# Patient Record
Sex: MALE | Race: WHITE | Employment: OTHER | URBAN - METROPOLITAN AREA
[De-identification: names, ages, dates, MRNs, and addresses within clinical notes are randomized per-mention and may not be internally consistent; named-entity substitution may affect disease eponyms.]

---

## 2023-03-03 NOTE — H&P (VIEW-ONLY)
Tim Jackson.   1940     ASSESSMENT:  1. Kidney Stones              s/p L URS/LL10/28/20   s/p URS (?)(`13)              CT A/P 11/28/22: 8 mm RRP stone; 2 mm non-obstructing LMP stone   Last Ucx 01/12/23: no growth                 2. BPH with Urinary Urgency / Urinary Frequency               Failed Flomax(SE: polyuria)              Symptom(s): Rec UTI, Intermittent FOS     3. Prostate Cancer Screening              Last PSA 2020- 1.5              HOLLI 02/01/22: 40 grams, benign      5. Gross Hematuria 2/2 #1              CTU 11/29/22- 8 x 4 mm stone in the dependent right renal pelvis. 2 mm nonobstructing left renal stone. Irregular urothelial thickening along the left ureter extending to the renal pelvis (series  604 images 61-66). No suspicious filling defects   Ucytology 09/20/22: [address]     6. ED, on Viagra 100 mg PRN  7. AFIB/Hx DVT, on Eliquis 5 mg   No longer Plavix 75 mg    Stone in renal pelvis, high chance of UPJ engagemnt. Not visible on KUB. On A/C therefore recommend URS. Cleared by cards. Urine culture sent. Stop Elliquis 2 days before. Regarding LUTS, didn't tolerate flomax in past due to urgency/frequency. Start finasteride for long term management. PLAN:  Urine sent for culture today, for pre-op planning purposes  Proceed to Cysto R URS/LL/JJ stent placement [facility] 03/21/23; D/c Eliquis 2 days prior per CARDS  BMP/CBC drawn today  RTO post op     DISCUSSION:      Chief Complaint   Patient presents with    Nephrolithiasis       HISTORY OF PRESENT ILLNESS: Tim Ontiveros is a 80 y.o. male who presents today for follow up for  H&P for upcoming Cysto R URS/LL/JJ stent placement [facility] 03/21/23 for a 8x4 mm RRP stone; also 2 mm non-obstructing stone noted on CT A/P 11/28/22. Patient with established history of kidney stones s/p left URS with laser lithotripsy and stent exchange on 10/28/2020 with Dr. Lexie Servin.  Also with BPH with LUTS, d/c Flomax 0.4 mg d/t increased urinary release tablet Take 10 mg by mouth daily    predniSONE (DELTASONE) 10 MG tablet prednisone 10 mg tablet   take 7 tablets by mouth on day 1 DECREASE BY 1 TABLET EACH DAY UNTIL FINISHED    pregabalin (LYRICA) 75 MG capsule pregabalin 75 mg capsule   take 1 capsule by mouth twice a day    rosuvastatin (CRESTOR) 20 MG tablet Take 10 mg by mouth    sildenafil (VIAGRA) 100 MG tablet Take 100 mg by mouth as needed    tamsulosin (FLOMAX) 0.4 MG capsule Take 0.4 mg by mouth    tiZANidine (ZANAFLEX) 2 MG tablet tizanidine 2 mg tablet     No current facility-administered medications for this visit. History reviewed. No pertinent family history. Social History     Socioeconomic History    Marital status:      Spouse name: None    Number of children: None    Years of education: None    Highest education level: None   Tobacco Use    Smoking status: Never    Smokeless tobacco: Never   Substance and Sexual Activity    Alcohol use: No    Drug use: No         PHYSICAL EXAMINATION:   Ht 6' 2\" (1.88 m)   Wt 250 lb (113.4 kg)   BMI 32.10 kg/m²   Constitutional: No acute distress. CV:  Radial pulse palpable. Respiratory: No respiratory distress. Abdomen:  Non-distended. Neuro/Psych:  Grossly intact. Appropriate affect. Lymphatic:   No gross lymphadenopathy. LABS TODAY:  Recent Results (from the past 12 hour(s))   AMB POC URINALYSIS DIP STICK AUTO W/O MICRO    Collection Time: 03/08/23  9:15 AM   Result Value Ref Range    Color, Urine, POC Yellow     Clarity, Urine, POC Clear     Glucose, Urine, POC Negative Negative    Bilirubin, Urine, POC Negative Negative    Ketones, Urine, POC Negative Negative    Specific Gravity, Urine, POC 1.025 1.001 - 1.035    Blood, Urine, POC 3+ Negative    pH, Urine, POC 7.0 4.6 - 8.0    Protein, Urine, POC 2+ Negative    Urobilinogen, POC Normal     Nitrite, Urine, POC Negative Negative    Leukocyte Esterase, Urine, POC Negative Negative         Thora Render.  Manuelito Egan MD  [facility]

## 2023-03-15 RX ORDER — CARVEDILOL 25 MG/1
25 TABLET ORAL 2 TIMES DAILY WITH MEALS
COMMUNITY

## 2023-03-15 NOTE — PERIOP NOTE
PRE-SURGICAL INSTRUCTIONS        Patient's Name:  Gabriela Singer. Today's Date:  3/15/2023            Covid Testing Date and Time:    Surgery Date:  3/21/2023                Do NOT eat or drink anything, including candy, gum, or ice chips after midnight on 3/20/2023, unless you have specific instructions from your surgeon or anesthesia provider to do so. You may brush your teeth before coming to the hospital.  No smoking 24 hours prior to the day of surgery. No alcohol 24 hours prior to the day of surgery. No recreational drugs for one week prior to the day of surgery. Leave all valuables, including money/purse, at home. Remove all jewelry, nail polish, acrylic nails, and makeup (including mascara); no lotions powders, deodorant, or perfume/cologne/after shave on the skin. Follow instruction for Hibiclens washes and CHG wipes from surgeon's office. Glasses/contact lenses and dentures may be worn to the hospital.  They will be removed prior to surgery. Call your doctor if symptoms of a cold or illness develop within 24-48 hours prior to your surgery. 11.  If you are having an outpatient procedure, please make arrangements for a responsible ADULT TO [address] [place] [address] and stay with you for 24 hours after your surgery. 12. ONE VISITOR in the hospital at this time for outpatient procedures. Exceptions may be made for surgical admissions, per nursing unit guidelines      Special Instructions:      Bring list of CURRENT medications. Bring any pertinent legal medical records. Take these medications the morning of surgery with a sip of water:  As directed by physician  Follow physician instructions about stopping anticoagulants. Complete bowel prep per MD instructions. On the day of surgery, come in the main entrance of [facility]. Let the  at the desk know you are there for surgery.   A staff member will come escort you to the surgical area on the second floor.    If you have any questions or concerns, please do not hesitate to call:     (Prior to the day of surgery) PAT department:  [phone]   (Day of surgery) Pre-Op department:  [phone]    These surgical instructions were reviewed with patient during the PAT phone call.

## 2023-03-20 ENCOUNTER — ANESTHESIA EVENT (OUTPATIENT)
Facility: HOSPITAL | Age: 83
DRG: 661 | End: 2023-03-20
Payer: MEDICARE

## 2023-03-21 ENCOUNTER — APPOINTMENT (OUTPATIENT)
Facility: HOSPITAL | Age: 83
DRG: 661 | End: 2023-03-21
Attending: UROLOGY
Payer: MEDICARE

## 2023-03-21 ENCOUNTER — ANESTHESIA (OUTPATIENT)
Facility: HOSPITAL | Age: 83
DRG: 661 | End: 2023-03-21
Payer: MEDICARE

## 2023-03-21 ENCOUNTER — HOSPITAL ENCOUNTER (INPATIENT)
Facility: HOSPITAL | Age: 83
LOS: 2 days | Discharge: HOME | DRG: 661 | End: 2023-03-23
Attending: UROLOGY | Admitting: UROLOGY
Payer: MEDICARE

## 2023-03-21 DIAGNOSIS — N20.0 KIDNEY STONE: Primary | ICD-10-CM

## 2023-03-21 PROBLEM — Z98.890: Status: ACTIVE | Noted: 2023-03-21

## 2023-03-21 PROCEDURE — 6360000002 HC RX W HCPCS: Performed by: ANESTHESIOLOGY

## 2023-03-21 PROCEDURE — C1758 CATHETER, URETERAL: HCPCS | Performed by: UROLOGY

## 2023-03-21 PROCEDURE — BT1D1ZZ FLUOROSCOPY OF RIGHT KIDNEY, URETER AND BLADDER USING LOW OSMOLAR CONTRAST: ICD-10-PCS | Performed by: UROLOGY

## 2023-03-21 PROCEDURE — 6360000002 HC RX W HCPCS: Performed by: NURSE ANESTHETIST, CERTIFIED REGISTERED

## 2023-03-21 PROCEDURE — 6370000000 HC RX 637 (ALT 250 FOR IP): Performed by: PHYSICIAN ASSISTANT

## 2023-03-21 PROCEDURE — 0TF68ZZ FRAGMENTATION IN RIGHT URETER, VIA NATURAL OR ARTIFICIAL OPENING ENDOSCOPIC: ICD-10-PCS | Performed by: UROLOGY

## 2023-03-21 PROCEDURE — 74420 UROGRAPHY RTRGR +-KUB: CPT

## 2023-03-21 PROCEDURE — 7100000000 HC PACU RECOVERY - FIRST 15 MIN: Performed by: UROLOGY

## 2023-03-21 PROCEDURE — C1769 GUIDE WIRE: HCPCS | Performed by: UROLOGY

## 2023-03-21 PROCEDURE — 3600000013 HC SURGERY LEVEL 3 ADDTL 15MIN: Performed by: UROLOGY

## 2023-03-21 PROCEDURE — 3600000003 HC SURGERY LEVEL 3 BASE: Performed by: UROLOGY

## 2023-03-21 PROCEDURE — 6360000004 HC RX CONTRAST MEDICATION: Performed by: UROLOGY

## 2023-03-21 PROCEDURE — 6360000002 HC RX W HCPCS

## 2023-03-21 PROCEDURE — 2580000003 HC RX 258: Performed by: NURSE ANESTHETIST, CERTIFIED REGISTERED

## 2023-03-21 PROCEDURE — 0T768DZ DILATION OF RIGHT URETER WITH INTRALUMINAL DEVICE, VIA NATURAL OR ARTIFICIAL OPENING ENDOSCOPIC: ICD-10-PCS | Performed by: UROLOGY

## 2023-03-21 PROCEDURE — C1747 HC ENDOSCOPE, SINGLE, URINARY TRACT: HCPCS | Performed by: UROLOGY

## 2023-03-21 PROCEDURE — 2500000003 HC RX 250 WO HCPCS: Performed by: ANESTHESIOLOGY

## 2023-03-21 PROCEDURE — 7100000001 HC PACU RECOVERY - ADDTL 15 MIN: Performed by: UROLOGY

## 2023-03-21 PROCEDURE — 2709999900 HC NON-CHARGEABLE SUPPLY: Performed by: UROLOGY

## 2023-03-21 PROCEDURE — 2720000010 HC SURG SUPPLY STERILE: Performed by: UROLOGY

## 2023-03-21 PROCEDURE — C2617 STENT, NON-COR, TEM W/O DEL: HCPCS | Performed by: UROLOGY

## 2023-03-21 PROCEDURE — 00918 ANES TRURL PX URTRL CAL RMVL: CPT | Performed by: ANESTHESIOLOGY

## 2023-03-21 PROCEDURE — 3700000001 HC ADD 15 MINUTES (ANESTHESIA): Performed by: UROLOGY

## 2023-03-21 PROCEDURE — C1894 INTRO/SHEATH, NON-LASER: HCPCS | Performed by: UROLOGY

## 2023-03-21 PROCEDURE — 2580000003 HC RX 258: Performed by: PHYSICIAN ASSISTANT

## 2023-03-21 PROCEDURE — 2500000003 HC RX 250 WO HCPCS: Performed by: NURSE ANESTHETIST, CERTIFIED REGISTERED

## 2023-03-21 PROCEDURE — 6370000000 HC RX 637 (ALT 250 FOR IP): Performed by: NURSE ANESTHETIST, CERTIFIED REGISTERED

## 2023-03-21 PROCEDURE — 6370000000 HC RX 637 (ALT 250 FOR IP): Performed by: UROLOGY

## 2023-03-21 PROCEDURE — 3700000000 HC ANESTHESIA ATTENDED CARE: Performed by: UROLOGY

## 2023-03-21 PROCEDURE — 2580000003 HC RX 258: Performed by: UROLOGY

## 2023-03-21 PROCEDURE — 99100 ANES PT EXTEME AGE<1 YR&>70: CPT | Performed by: ANESTHESIOLOGY

## 2023-03-21 PROCEDURE — 6360000002 HC RX W HCPCS: Performed by: PHYSICIAN ASSISTANT

## 2023-03-21 PROCEDURE — 1100000000 HC RM PRIVATE

## 2023-03-21 PROCEDURE — 6360000002 HC RX W HCPCS: Performed by: UROLOGY

## 2023-03-21 DEVICE — URETERAL STENT
Type: IMPLANTABLE DEVICE | Site: URETER | Status: FUNCTIONAL
Brand: PERCUFLEX™ PLUS

## 2023-03-21 RX ORDER — SODIUM CHLORIDE 0.9 % (FLUSH) 0.9 %
5-40 SYRINGE (ML) INJECTION PRN
Status: DISCONTINUED | OUTPATIENT
Start: 2023-03-21 | End: 2023-03-21 | Stop reason: HOSPADM

## 2023-03-21 RX ORDER — ENOXAPARIN SODIUM 100 MG/ML
30 INJECTION SUBCUTANEOUS 2 TIMES DAILY
Status: DISCONTINUED | OUTPATIENT
Start: 2023-03-21 | End: 2023-03-23 | Stop reason: HOSPADM

## 2023-03-21 RX ORDER — ONDANSETRON 4 MG/1
4 TABLET, ORALLY DISINTEGRATING ORAL EVERY 8 HOURS PRN
Status: DISCONTINUED | OUTPATIENT
Start: 2023-03-21 | End: 2023-03-23 | Stop reason: HOSPADM

## 2023-03-21 RX ORDER — HYDRALAZINE HYDROCHLORIDE 20 MG/ML
10 INJECTION INTRAMUSCULAR; INTRAVENOUS ONCE
Status: COMPLETED | OUTPATIENT
Start: 2023-03-21 | End: 2023-03-21

## 2023-03-21 RX ORDER — FENTANYL CITRATE 50 UG/ML
50 INJECTION, SOLUTION INTRAMUSCULAR; INTRAVENOUS EVERY 5 MIN PRN
Status: COMPLETED | OUTPATIENT
Start: 2023-03-21 | End: 2023-03-21

## 2023-03-21 RX ORDER — HYDROCODONE BITARTRATE AND ACETAMINOPHEN 5; 325 MG/1; MG/1
1 TABLET ORAL EVERY 6 HOURS PRN
Qty: 12 TABLET | Refills: 0 | Status: SHIPPED | OUTPATIENT
Start: 2023-03-21 | End: 2023-03-26

## 2023-03-21 RX ORDER — FAMOTIDINE 20 MG/1
20 TABLET, FILM COATED ORAL ONCE
Status: COMPLETED | OUTPATIENT
Start: 2023-03-21 | End: 2023-03-21

## 2023-03-21 RX ORDER — POLYETHYLENE GLYCOL 3350 17 G/17G
17 POWDER, FOR SOLUTION ORAL DAILY PRN
Status: DISCONTINUED | OUTPATIENT
Start: 2023-03-21 | End: 2023-03-23 | Stop reason: HOSPADM

## 2023-03-21 RX ORDER — LIDOCAINE HYDROCHLORIDE 10 MG/ML
1 INJECTION, SOLUTION EPIDURAL; INFILTRATION; INTRACAUDAL; PERINEURAL
Status: DISCONTINUED | OUTPATIENT
Start: 2023-03-21 | End: 2023-03-21 | Stop reason: HOSPADM

## 2023-03-21 RX ORDER — LIDOCAINE HYDROCHLORIDE 20 MG/ML
INJECTION, SOLUTION EPIDURAL; INFILTRATION; INTRACAUDAL; PERINEURAL PRN
Status: DISCONTINUED | OUTPATIENT
Start: 2023-03-21 | End: 2023-03-21 | Stop reason: SDUPTHER

## 2023-03-21 RX ORDER — ATROPA BELLADONNA AND OPIUM 16.2; 3 MG/1; MG/1
30 SUPPOSITORY RECTAL EVERY 8 HOURS PRN
Status: DISCONTINUED | OUTPATIENT
Start: 2023-03-21 | End: 2023-03-23 | Stop reason: HOSPADM

## 2023-03-21 RX ORDER — FENTANYL CITRATE 50 UG/ML
INJECTION, SOLUTION INTRAMUSCULAR; INTRAVENOUS
Status: COMPLETED
Start: 2023-03-21 | End: 2023-03-21

## 2023-03-21 RX ORDER — SODIUM CHLORIDE, SODIUM LACTATE, POTASSIUM CHLORIDE, CALCIUM CHLORIDE 600; 310; 30; 20 MG/100ML; MG/100ML; MG/100ML; MG/100ML
INJECTION, SOLUTION INTRAVENOUS CONTINUOUS
Status: DISCONTINUED | OUTPATIENT
Start: 2023-03-21 | End: 2023-03-23 | Stop reason: HOSPADM

## 2023-03-21 RX ORDER — PHENAZOPYRIDINE HYDROCHLORIDE 200 MG/1
200 TABLET, FILM COATED ORAL 3 TIMES DAILY
Qty: 15 TABLET | Refills: 0 | Status: SHIPPED | OUTPATIENT
Start: 2023-03-21 | End: 2023-03-26

## 2023-03-21 RX ORDER — FENTANYL CITRATE 50 UG/ML
25 INJECTION, SOLUTION INTRAMUSCULAR; INTRAVENOUS EVERY 5 MIN PRN
Status: DISCONTINUED | OUTPATIENT
Start: 2023-03-21 | End: 2023-03-21 | Stop reason: HOSPADM

## 2023-03-21 RX ORDER — LABETALOL HYDROCHLORIDE 5 MG/ML
5 INJECTION, SOLUTION INTRAVENOUS ONCE
Status: COMPLETED | OUTPATIENT
Start: 2023-03-21 | End: 2023-03-21

## 2023-03-21 RX ORDER — AMLODIPINE BESYLATE 5 MG/1
5 TABLET ORAL PRN
Status: DISCONTINUED | OUTPATIENT
Start: 2023-03-21 | End: 2023-03-23 | Stop reason: HOSPADM

## 2023-03-21 RX ORDER — FENTANYL CITRATE 50 UG/ML
INJECTION, SOLUTION INTRAMUSCULAR; INTRAVENOUS PRN
Status: DISCONTINUED | OUTPATIENT
Start: 2023-03-21 | End: 2023-03-21 | Stop reason: SDUPTHER

## 2023-03-21 RX ORDER — PROPOFOL 10 MG/ML
INJECTION, EMULSION INTRAVENOUS PRN
Status: DISCONTINUED | OUTPATIENT
Start: 2023-03-21 | End: 2023-03-21 | Stop reason: SDUPTHER

## 2023-03-21 RX ORDER — SODIUM CHLORIDE 9 MG/ML
INJECTION, SOLUTION INTRAVENOUS PRN
Status: DISCONTINUED | OUTPATIENT
Start: 2023-03-21 | End: 2023-03-23 | Stop reason: HOSPADM

## 2023-03-21 RX ORDER — ONDANSETRON 2 MG/ML
INJECTION INTRAMUSCULAR; INTRAVENOUS
Status: COMPLETED
Start: 2023-03-21 | End: 2023-03-21

## 2023-03-21 RX ORDER — SODIUM CHLORIDE 0.9 % (FLUSH) 0.9 %
5-40 SYRINGE (ML) INJECTION EVERY 12 HOURS SCHEDULED
Status: DISCONTINUED | OUTPATIENT
Start: 2023-03-21 | End: 2023-03-23 | Stop reason: HOSPADM

## 2023-03-21 RX ORDER — CARVEDILOL 25 MG/1
25 TABLET ORAL 2 TIMES DAILY WITH MEALS
Status: DISCONTINUED | OUTPATIENT
Start: 2023-03-21 | End: 2023-03-23 | Stop reason: HOSPADM

## 2023-03-21 RX ORDER — MEPERIDINE HYDROCHLORIDE 25 MG/ML
12.5 INJECTION INTRAMUSCULAR; INTRAVENOUS; SUBCUTANEOUS EVERY 5 MIN PRN
Status: DISCONTINUED | OUTPATIENT
Start: 2023-03-21 | End: 2023-03-21 | Stop reason: HOSPADM

## 2023-03-21 RX ORDER — SODIUM CHLORIDE 0.9 % (FLUSH) 0.9 %
5-40 SYRINGE (ML) INJECTION PRN
Status: DISCONTINUED | OUTPATIENT
Start: 2023-03-21 | End: 2023-03-23 | Stop reason: HOSPADM

## 2023-03-21 RX ORDER — TROSPIUM CHLORIDE 20 MG/1
20 TABLET, FILM COATED ORAL
Status: DISCONTINUED | OUTPATIENT
Start: 2023-03-22 | End: 2023-03-23 | Stop reason: HOSPADM

## 2023-03-21 RX ORDER — ONDANSETRON 2 MG/ML
4 INJECTION INTRAMUSCULAR; INTRAVENOUS EVERY 6 HOURS PRN
Status: DISCONTINUED | OUTPATIENT
Start: 2023-03-21 | End: 2023-03-23 | Stop reason: HOSPADM

## 2023-03-21 RX ORDER — SODIUM CHLORIDE, SODIUM LACTATE, POTASSIUM CHLORIDE, CALCIUM CHLORIDE 600; 310; 30; 20 MG/100ML; MG/100ML; MG/100ML; MG/100ML
INJECTION, SOLUTION INTRAVENOUS CONTINUOUS
Status: DISCONTINUED | OUTPATIENT
Start: 2023-03-21 | End: 2023-03-21 | Stop reason: HOSPADM

## 2023-03-21 RX ORDER — ONDANSETRON 2 MG/ML
4 INJECTION INTRAMUSCULAR; INTRAVENOUS ONCE
Status: COMPLETED | OUTPATIENT
Start: 2023-03-21 | End: 2023-03-21

## 2023-03-21 RX ORDER — PHENAZOPYRIDINE HYDROCHLORIDE 200 MG/1
200 TABLET, FILM COATED ORAL 3 TIMES DAILY
Status: DISCONTINUED | OUTPATIENT
Start: 2023-03-21 | End: 2023-03-23 | Stop reason: HOSPADM

## 2023-03-21 RX ORDER — SODIUM CHLORIDE 0.9 % (FLUSH) 0.9 %
5-40 SYRINGE (ML) INJECTION EVERY 12 HOURS SCHEDULED
Status: DISCONTINUED | OUTPATIENT
Start: 2023-03-21 | End: 2023-03-21 | Stop reason: HOSPADM

## 2023-03-21 RX ORDER — LISINOPRIL 5 MG/1
10 TABLET ORAL DAILY
Status: DISCONTINUED | OUTPATIENT
Start: 2023-03-21 | End: 2023-03-23 | Stop reason: HOSPADM

## 2023-03-21 RX ORDER — SODIUM CHLORIDE 9 MG/ML
INJECTION, SOLUTION INTRAVENOUS PRN
Status: DISCONTINUED | OUTPATIENT
Start: 2023-03-21 | End: 2023-03-21 | Stop reason: HOSPADM

## 2023-03-21 RX ORDER — IPRATROPIUM BROMIDE AND ALBUTEROL SULFATE 2.5; .5 MG/3ML; MG/3ML
1 SOLUTION RESPIRATORY (INHALATION)
Status: DISCONTINUED | OUTPATIENT
Start: 2023-03-21 | End: 2023-03-21 | Stop reason: HOSPADM

## 2023-03-21 RX ORDER — ONDANSETRON 2 MG/ML
INJECTION INTRAMUSCULAR; INTRAVENOUS PRN
Status: DISCONTINUED | OUTPATIENT
Start: 2023-03-21 | End: 2023-03-21 | Stop reason: SDUPTHER

## 2023-03-21 RX ORDER — TAMSULOSIN HYDROCHLORIDE 0.4 MG/1
0.4 CAPSULE ORAL DAILY
Status: DISCONTINUED | OUTPATIENT
Start: 2023-03-21 | End: 2023-03-23 | Stop reason: HOSPADM

## 2023-03-21 RX ORDER — LABETALOL HYDROCHLORIDE 5 MG/ML
10 INJECTION, SOLUTION INTRAVENOUS ONCE
Status: DISCONTINUED | OUTPATIENT
Start: 2023-03-21 | End: 2023-03-21

## 2023-03-21 RX ORDER — ACETAMINOPHEN 325 MG/1
650 TABLET ORAL EVERY 4 HOURS PRN
Status: DISCONTINUED | OUTPATIENT
Start: 2023-03-21 | End: 2023-03-23 | Stop reason: HOSPADM

## 2023-03-21 RX ORDER — LEVOFLOXACIN 500 MG/1
500 TABLET, FILM COATED ORAL DAILY
Qty: 5 TABLET | Refills: 0 | Status: SHIPPED | OUTPATIENT
Start: 2023-03-21 | End: 2023-03-26

## 2023-03-21 RX ORDER — IODIXANOL 320 MG/ML
INJECTION, SOLUTION INTRAVASCULAR PRN
Status: DISCONTINUED | OUTPATIENT
Start: 2023-03-21 | End: 2023-03-21 | Stop reason: ALTCHOICE

## 2023-03-21 RX ADMIN — CARVEDILOL 25 MG: 25 TABLET, FILM COATED ORAL at 22:21

## 2023-03-21 RX ADMIN — LABETALOL HYDROCHLORIDE 5 MG: 5 INJECTION, SOLUTION INTRAVENOUS at 17:50

## 2023-03-21 RX ADMIN — CEFTRIAXONE 1000 MG: 1 INJECTION, POWDER, FOR SOLUTION INTRAMUSCULAR; INTRAVENOUS at 15:23

## 2023-03-21 RX ADMIN — LABETALOL HYDROCHLORIDE 5 MG: 5 INJECTION, SOLUTION INTRAVENOUS at 19:05

## 2023-03-21 RX ADMIN — SODIUM CHLORIDE, PRESERVATIVE FREE 10 ML: 5 INJECTION INTRAVENOUS at 22:31

## 2023-03-21 RX ADMIN — ONDANSETRON 4 MG: 2 INJECTION INTRAMUSCULAR; INTRAVENOUS at 16:03

## 2023-03-21 RX ADMIN — FENTANYL CITRATE 25 MCG: 50 INJECTION, SOLUTION INTRAMUSCULAR; INTRAVENOUS at 16:14

## 2023-03-21 RX ADMIN — HYDROMORPHONE HYDROCHLORIDE 0.5 MG: 1 INJECTION, SOLUTION INTRAMUSCULAR; INTRAVENOUS; SUBCUTANEOUS at 18:55

## 2023-03-21 RX ADMIN — LIDOCAINE HYDROCHLORIDE 40 MG: 20 INJECTION, SOLUTION EPIDURAL; INFILTRATION; INTRACAUDAL; PERINEURAL at 15:19

## 2023-03-21 RX ADMIN — GENTAMICIN SULFATE 410 MG: 40 INJECTION, SOLUTION INTRAMUSCULAR; INTRAVENOUS at 15:26

## 2023-03-21 RX ADMIN — LABETALOL HYDROCHLORIDE 5 MG: 5 INJECTION, SOLUTION INTRAVENOUS at 18:30

## 2023-03-21 RX ADMIN — FENTANYL CITRATE 50 MCG: 50 INJECTION INTRAMUSCULAR; INTRAVENOUS at 18:00

## 2023-03-21 RX ADMIN — PHENAZOPYRIDINE 200 MG: 200 TABLET ORAL at 22:21

## 2023-03-21 RX ADMIN — HYDROMORPHONE HYDROCHLORIDE 0.5 MG: 1 INJECTION, SOLUTION INTRAMUSCULAR; INTRAVENOUS; SUBCUTANEOUS at 20:50

## 2023-03-21 RX ADMIN — PROPOFOL 150 MG: 10 INJECTION, EMULSION INTRAVENOUS at 15:19

## 2023-03-21 RX ADMIN — FENTANYL CITRATE 50 MCG: 50 INJECTION INTRAMUSCULAR; INTRAVENOUS at 16:55

## 2023-03-21 RX ADMIN — ACETAMINOPHEN 650 MG: 325 TABLET ORAL at 23:41

## 2023-03-21 RX ADMIN — ONDANSETRON 4 MG: 2 INJECTION INTRAMUSCULAR; INTRAVENOUS at 18:15

## 2023-03-21 RX ADMIN — FAMOTIDINE 20 MG: 20 TABLET ORAL at 14:30

## 2023-03-21 RX ADMIN — PHENAZOPYRIDINE 200 MG: 200 TABLET ORAL at 17:04

## 2023-03-21 RX ADMIN — TAMSULOSIN HYDROCHLORIDE 0.4 MG: 0.4 CAPSULE ORAL at 17:04

## 2023-03-21 RX ADMIN — HYDRALAZINE HYDROCHLORIDE 10 MG: 20 INJECTION INTRAMUSCULAR; INTRAVENOUS at 18:41

## 2023-03-21 RX ADMIN — ENOXAPARIN SODIUM 30 MG: 100 INJECTION SUBCUTANEOUS at 22:24

## 2023-03-21 RX ADMIN — SODIUM CHLORIDE, POTASSIUM CHLORIDE, SODIUM LACTATE AND CALCIUM CHLORIDE: 600; 310; 30; 20 INJECTION, SOLUTION INTRAVENOUS at 14:30

## 2023-03-21 RX ADMIN — FENTANYL CITRATE 25 MCG: 50 INJECTION, SOLUTION INTRAMUSCULAR; INTRAVENOUS at 15:44

## 2023-03-21 RX ADMIN — FENTANYL CITRATE 50 MCG: 50 INJECTION, SOLUTION INTRAMUSCULAR; INTRAVENOUS at 15:19

## 2023-03-21 ASSESSMENT — PAIN DESCRIPTION - LOCATION
LOCATION: ABDOMEN
LOCATION: HEAD
LOCATION: ABDOMEN;PELVIS
LOCATION: ABDOMEN;PELVIS

## 2023-03-21 ASSESSMENT — PAIN DESCRIPTION - PAIN TYPE
TYPE: ACUTE PAIN;SURGICAL PAIN

## 2023-03-21 ASSESSMENT — PAIN - FUNCTIONAL ASSESSMENT
PAIN_FUNCTIONAL_ASSESSMENT: ACTIVITIES ARE NOT PREVENTED
PAIN_FUNCTIONAL_ASSESSMENT: ACTIVITIES ARE NOT PREVENTED

## 2023-03-21 ASSESSMENT — PAIN SCALES - GENERAL
PAINLEVEL_OUTOF10: 7
PAINLEVEL_OUTOF10: 7
PAINLEVEL_OUTOF10: 4
PAINLEVEL_OUTOF10: 7
PAINLEVEL_OUTOF10: 2
PAINLEVEL_OUTOF10: 5
PAINLEVEL_OUTOF10: 2
PAINLEVEL_OUTOF10: 7

## 2023-03-21 ASSESSMENT — PAIN DESCRIPTION - ONSET
ONSET: SUDDEN
ONSET: AWAKENED FROM SLEEP

## 2023-03-21 ASSESSMENT — PAIN DESCRIPTION - DESCRIPTORS
DESCRIPTORS: ACHING
DESCRIPTORS: ACHING;CRAMPING
DESCRIPTORS: ACHING;CRAMPING

## 2023-03-21 ASSESSMENT — PAIN DESCRIPTION - ORIENTATION
ORIENTATION: RIGHT;LEFT;ANTERIOR;POSTERIOR
ORIENTATION: LOWER;MID
ORIENTATION: MID;LOWER
ORIENTATION: MID;LOWER

## 2023-03-21 ASSESSMENT — PAIN DESCRIPTION - FREQUENCY
FREQUENCY: INTERMITTENT
FREQUENCY: INTERMITTENT

## 2023-03-21 NOTE — DISCHARGE INSTRUCTIONS
help?   Call 911 anytime you think you may need emergency care. For example, call if:    You passed out (lost consciousness). You have chest pain, are short of breath, or cough up blood. Call your doctor now or seek immediate medical care if:    You have pain that does not get better after you take pain medicine. You have new or more blood clots in your urine. (It is normal for the urine to be pink for a few days.)     You cannot urinate. You have symptoms of a urinary tract infection. These may include:  Pain or burning when you urinate. A frequent need to urinate without being able to pass much urine. Pain in the flank, which is just below the rib cage and above the waist on either side of the back. Blood in the urine. A fever. You are sick to your stomach or cannot drink fluids. You have signs of a blood clot in your leg (called a deep vein thrombosis), such as:  Pain in the calf, back of the knee, thigh, or groin. Redness and swelling in your leg. Watch closely for any changes in your health, and be sure to contact your doctor if you have any problems. Where can you learn more? Go to http://www.woods.com/ and enter Y960 to learn more about \"Shock Wave Lithotripsy: What to Expect at Home. \"  Current as of: June 16, 2022               Content Version: 13.6  © 2006-2023 [employer]wise, Incorporated. Care instructions adapted under license by [facility] ([facility]). If you have questions about a medical condition or this instruction, always ask your healthcare professional. [place] [address] any warranty or liability for your use of this information. Cystoscopy: What to Expect at [address]     A cystoscopy is a procedure that lets a doctor look inside of the bladder and the urethra. The urethra is the tube that carries urine from the bladder to outside the body. The doctor uses a thin, lighted tool called a cystoscope.  Your bladder is filled with moxifloxacin, norfloxacin, ofloxacin, and others). Levofloxacin may cause swelling or tearing of a tendon (the fiber that connects bones to muscles in the body), especially in the Achilles' tendon of the heel. This can happen during treatment or up to several months after you stop taking levofloxacin. Tendon problems may be more likely in certain people (children and older adults, or people who use steroid medicine or have had an organ transplant). Tell your doctor if you have ever had:  tendon problems, bone problems, arthritis or other joint problems (especially in children);  blood circulation problems, aneurysm, narrowing or hardening of the arteries;  heart problems, high blood pressure;  a genetic disease such as Marfan syndrome or Ehler's-Danlos syndrome;  diabetes;  a muscle or nerve disorder, such as myasthenia gravis;  kidney disease;  seizures or epilepsy;  a head injury or brain tumor;  long QT syndrome (in you or a family member); or  low levels of potassium in your blood (hypokalemia). Do not give this medicine to a child without medical advice. It is not known whether this medicine will harm an unborn baby. Tell your doctor if you are pregnant. You should not breast-feed while using this medicine. How should I take levofloxacin? Follow all directions on your prescription label and read all medication guides or instruction sheets. Use the medicine exactly as directed. Take levofloxacin with water, at the same time each day. Drink extra fluids to keep your kidneys working properly while taking this medicine. You may take levofloxacin tablets with or without food. Take levofloxacin oral solution (liquid)  on an empty stomach, at least 1 hour before or 2 hours after a meal.  Measure liquid medicine carefully. Use the dosing syringe provided, or use a medicine dose-measuring device (not a kitchen spoon).   Use this medicine for the full prescribed length of time, even if your symptoms quickly

## 2023-03-21 NOTE — INTERVAL H&P NOTE
Update History & Physical    The patient's History and Physical of March 21, Progress was reviewed with the patient and I examined the patient. There was no change. The surgical site was confirmed by the patient and me. Plan: The risks, benefits, expected outcome, and alternative to the recommended procedure have been discussed with the patient. Patient understands and wants to proceed with the procedure.      Electronically signed by Sheila Ferrara MD on 3/21/2023 at 2:40 PM

## 2023-03-21 NOTE — OP NOTE
Operative Note  Patient: Raf Martines. Sex: male             MRN: [id number]  YOB: 1940      Age:  80 y.o. Preoperative Diagnosis: Kidney stone [N20.0]  Postoperative Diagnosis:  * No post-op diagnosis entered *  Surgeon: Lynsey Peng     Indication: This is a 81 y/o man with hematuria, abnormal CTU in the proximal right ureter and 8mm renal pelvic stone here today for cystoscopy, ureteroscopy and stone extraction as well as possible biopsy of any abnormal lesions. Procedure:    1) Cystoscopy  2) Retrograde pyelogram with interpretation  3) < 1 hour physician fluoroscopy imaging interpretation time  4) Right side Ureteroscopy, laser lithotripsy and stone extraction   5) JJ ureteral stent placement ON STRING    Findings:    1). Normal cystoscopy   2). Retrograde pyelograms without evidence of hydronephrosis or filling defects  3). Total stone burden 8mm, completely cleared   4). 6x26 JJ stent placed ON STRING    Narrative of events: The patient was brought to the operative suite. Anesthesia was induced and preoperative antibiotics were administered. They were then placed in the dorsal lithotomy position and their external genitalia was prepped and draped in the usual fashion. A surgical timeout was performed confirming the patient's name, date of birth, laterality, and antibiotics. All were in agreement. A 22 Yi cystourethroscope was then inserted into the patients bladder. The urethra revealed no abnormalities. Prostate was with severe trilobar hypertrophy. Thorough cystoscopy was performed with both the 30 degree and 70 degree lens. The right ureteral orifice appeared likely refluxing. The right ureteral orifice was cannulated with a 0.035 sensor tip wire and confirmed in the renal pelvis. A duel lumen was advanced over this and retrograde pyelogram was performed no evidence of hydronephrosis or filling defects.   Second wire was advanced and duel lumen was removed. Ureteral access sheath was advanced to the UPJ under direct fluoroscopic guidance and flexible ureteroscopy was performed. The proximal ureter, the UPJ and renal pelvis were with evidence of inflammation likely from the jagged COD stone that was in the same location correlating to the inflammation. The stone was identified and laser lithotripsy was performed. It was dusted with no large fragments remaining. Thorough pyeloscopy was again performed to ensure no residual fragments. The ureter was quite capacious, I suspect from possible reflux based on the intramural portion and bladder findings. The sheath was removed slowly and the entire ureter was well visualized without evidence of residual stones or injury. A stent was advanced over the wire and deployed using fluoroscopic technique with the string left in place. The bladder was drained and patient was awoken from anesthesia.         Estimated Blood Loss: <5CC     Anesthesia:  General                  Implants: * No implants in log *    Specimens: * No specimens in log *     Drains:JJ stent            Complications:  None           Plan  Pull stent in 3 days  Return in 3 months with KUB/US/Litholink and Flow/PVR with me     Danae Perez MD  3/21/2023

## 2023-03-21 NOTE — ANESTHESIA PRE PROCEDURE
taking: Reported on 3/15/2023    Ar Automatic Reconciliation   lisinopril (PRINIVIL;ZESTRIL) 10 MG tablet Take 10 mg by mouth daily 6/21/22   Ar Automatic Reconciliation   oxybutynin (DITROPAN-XL) 10 MG extended release tablet Take 10 mg by mouth daily 1/14/21   Ar Automatic Reconciliation   predniSONE (DELTASONE) 10 MG tablet prednisone 10 mg tablet   take 7 tablets by mouth on day 1 DECREASE BY 1 TABLET EACH DAY UNTIL FINISHED  Patient not taking: Reported on 3/15/2023    Ar Automatic Reconciliation   pregabalin (LYRICA) 75 MG capsule pregabalin 75 mg capsule   take 1 capsule by mouth twice a day    Ar Automatic Reconciliation   rosuvastatin (CRESTOR) 20 MG tablet Take 10 mg by mouth 1/4/21   Ar Automatic Reconciliation   sildenafil (VIAGRA) 100 MG tablet Take 100 mg by mouth as needed    Ar Automatic Reconciliation   tamsulosin (FLOMAX) 0.4 MG capsule Take 0.4 mg by mouth 5/3/22   Ar Automatic Reconciliation   tiZANidine (ZANAFLEX) 2 MG tablet tizanidine 2 mg tablet    Ar Automatic Reconciliation       Current medications:    Current Facility-Administered Medications   Medication Dose Route Frequency Provider Last Rate Last Admin    lidocaine PF 1 % injection 1 mL  1 mL IntraDERmal Once PRN Ramonia Sallies, APRN - CRNA        famotidine (PEPCID) tablet 20 mg  20 mg Oral Once Ramonia Sallies, APRN - CRNA        lactated ringers IV soln infusion   IntraVENous Continuous Ramonia Sallies, APRN - CRNA        sodium chloride flush 0.9 % injection 5-40 mL  5-40 mL IntraVENous PRN Ramonia Sallies, APRN - CRNA        cefTRIAXone (ROCEPHIN) 1,000 mg in sterile water 10 mL IV syringe  1,000 mg IntraVENous On Call to Elaine Ba MD           Allergies:  No Known Allergies    Problem List:    Patient Active Problem List   Diagnosis Code    Hypertension I10    Neck pain M54.2    Chronic prostatitis N41.1    Impotence of organic origin N52.9    Long term (current) use of anticoagulants Z79.01    Elevated PSA R97.20

## 2023-03-21 NOTE — ANESTHESIA POSTPROCEDURE EVALUATION
Department of Anesthesiology  Postprocedure Note    Patient: Lois Sky MRN: [id number]  YOB: 1940  Date of evaluation: 3/21/2023      Procedure Summary     Date: 03/21/23 Room / Location: [facility] MAIN 08 / [facility] MAIN OR    Anesthesia Start: 1509 Anesthesia Stop: 7118    Procedure: CYSTOSCOPY, RIGHT URETEROSCOPY, LASER LITHOTRIPSY, DOUBLE J STENT; C-ARM; HOLMIUM (Right: Ureter) Diagnosis:       Kidney stone      (Kidney stone [N20.0])    Surgeons: Grace Dickey MD Responsible Provider: Pastor Laurie MD    Anesthesia Type: general ASA Status: 3          Anesthesia Type: No value filed.     Angelina Phase I: Angelina Score: 10    Angelina Phase II:        Anesthesia Post Evaluation    Patient location during evaluation: bedside  Patient participation: complete - patient participated  Airway patency: patent  Complications: no  Cardiovascular status: hemodynamically stable  Respiratory status: acceptable  Hydration status: stable

## 2023-03-22 PROCEDURE — 6370000000 HC RX 637 (ALT 250 FOR IP): Performed by: UROLOGY

## 2023-03-22 PROCEDURE — 6360000002 HC RX W HCPCS: Performed by: PHYSICIAN ASSISTANT

## 2023-03-22 PROCEDURE — 1100000000 HC RM PRIVATE

## 2023-03-22 PROCEDURE — 6360000002 HC RX W HCPCS: Performed by: ANESTHESIOLOGY

## 2023-03-22 PROCEDURE — 2580000003 HC RX 258: Performed by: PHYSICIAN ASSISTANT

## 2023-03-22 PROCEDURE — 6370000000 HC RX 637 (ALT 250 FOR IP): Performed by: PHYSICIAN ASSISTANT

## 2023-03-22 RX ADMIN — TAMSULOSIN HYDROCHLORIDE 0.4 MG: 0.4 CAPSULE ORAL at 09:12

## 2023-03-22 RX ADMIN — PHENAZOPYRIDINE 200 MG: 200 TABLET ORAL at 15:38

## 2023-03-22 RX ADMIN — CARVEDILOL 25 MG: 25 TABLET, FILM COATED ORAL at 09:12

## 2023-03-22 RX ADMIN — LISINOPRIL 10 MG: 5 TABLET ORAL at 09:12

## 2023-03-22 RX ADMIN — ENOXAPARIN SODIUM 30 MG: 100 INJECTION SUBCUTANEOUS at 09:15

## 2023-03-22 RX ADMIN — HYDROMORPHONE HYDROCHLORIDE 0.5 MG: 1 INJECTION, SOLUTION INTRAMUSCULAR; INTRAVENOUS; SUBCUTANEOUS at 09:15

## 2023-03-22 RX ADMIN — ONDANSETRON 4 MG: 4 TABLET, ORALLY DISINTEGRATING ORAL at 15:37

## 2023-03-22 RX ADMIN — SODIUM CHLORIDE, PRESERVATIVE FREE 10 ML: 5 INJECTION INTRAVENOUS at 09:15

## 2023-03-22 RX ADMIN — ONDANSETRON 4 MG: 2 INJECTION INTRAMUSCULAR; INTRAVENOUS at 09:13

## 2023-03-22 RX ADMIN — HYDROMORPHONE HYDROCHLORIDE 0.5 MG: 1 INJECTION, SOLUTION INTRAMUSCULAR; INTRAVENOUS; SUBCUTANEOUS at 15:37

## 2023-03-22 RX ADMIN — TROSPIUM CHLORIDE 20 MG: 20 TABLET, FILM COATED ORAL at 15:40

## 2023-03-22 RX ADMIN — PHENAZOPYRIDINE 200 MG: 200 TABLET ORAL at 09:12

## 2023-03-22 RX ADMIN — TROSPIUM CHLORIDE 20 MG: 20 TABLET, FILM COATED ORAL at 09:12

## 2023-03-22 RX ADMIN — CARVEDILOL 25 MG: 25 TABLET, FILM COATED ORAL at 16:38

## 2023-03-22 RX ADMIN — PHENAZOPYRIDINE 200 MG: 200 TABLET ORAL at 21:35

## 2023-03-22 RX ADMIN — SODIUM CHLORIDE, PRESERVATIVE FREE 10 ML: 5 INJECTION INTRAVENOUS at 21:51

## 2023-03-22 RX ADMIN — ENOXAPARIN SODIUM 30 MG: 100 INJECTION SUBCUTANEOUS at 21:35

## 2023-03-22 ASSESSMENT — PAIN SCALES - GENERAL
PAINLEVEL_OUTOF10: 10
PAINLEVEL_OUTOF10: 6
PAINLEVEL_OUTOF10: 8

## 2023-03-22 ASSESSMENT — PAIN DESCRIPTION - ORIENTATION
ORIENTATION: RIGHT
ORIENTATION: RIGHT

## 2023-03-22 ASSESSMENT — PAIN - FUNCTIONAL ASSESSMENT: PAIN_FUNCTIONAL_ASSESSMENT: PREVENTS OR INTERFERES SOME ACTIVE ACTIVITIES AND ADLS

## 2023-03-22 NOTE — PROGRESS NOTES
Comprehensive Nutrition Assessment    Type and Reason for Visit:  Initial    Nutrition Recommendations/Plan:   Continue with regular diet  Monitor and encourage PO intake as needed to maintain intake of >75% of meals     Malnutrition Assessment:  Malnutrition Status: At risk for malnutrition (Comment) (Severe 19.6% weight loss x10 months per chart review) (03/22/23 1407)    Weight trends:  3/21/23 - 205.9lb (bedscale)  5/3/22 - 255lb (not specified)  Estimated 19.6% weight loss x10 months      Nutrition Assessment:    Pt with kidney stone, s/p Cysto R URS/LL/JJ stent placement 3/21/23. Pt currently receiving Regular diet, tolerating well. Noted pt with significant 19.6% weight loss x10 months per chart review. This RD providing remote coverage, attempted to call pt at 1:15pm and 2:00pm, with no answer. Unable to assess PO intake history or if weight loss was intentional to establish malnutrition status at this time. Nutrition Related Findings:    BM: PTA  Pertinent Labs reviewed  Pertinent Meds: Lovenox, carvedilol Wound Type: Surgical Incision (Surgical wound to lower penis)       Current Nutrition Intake & Therapies:    Average Meal Intake: Unable to assess  ADULT DIET; Regular    Anthropometric Measures:  Height: 6' 2.02\" (188 cm)  Ideal Body Weight (IBW): 190 lbs (86 kg)    Admission Body Weight: 205 lb 9.6 oz (93.3 kg)  Current Body Weight: 205 lb 9.6 oz (93.3 kg), 108.2 % IBW. Weight Source: Bed Scale  Current BMI (kg/m2): 26.4  Usual Body Weight:  (Unable to determine at this time)  BMI Categories: Overweight (BMI 25.0-29. 9)    Estimated Daily Nutrient Needs:  Energy Requirements Based On: Kcal/kg  Weight Used for Energy Requirements: Current  Energy (kcal/day): 1866-2332kcal (20-25kcal/kg)  Weight Used for Protein Requirements: Current  Protein (g/day): 93-112g (1-1.2g/kg)  Method Used for Fluid Requirements: 1 ml/kcal  Fluid (ml/day): 3155-7813GA    Nutrition Diagnosis:   Inadequate oral intake related to acute injury/trauma as evidenced by  (no PO intake available since Cysto R URS/LL/JJ stent placement on 3/21/23)    Nutrition Interventions:   Food and/or Nutrient Delivery: Continue Current Diet  Nutrition Education/Counseling: Education not indicated  Coordination of Nutrition Care: Continue to monitor while inpatient    Goals:  Goals: Meet at least 75% of estimated needs       Nutrition Monitoring and Evaluation:   Behavioral-Environmental Outcomes: None Identified  Food/Nutrient Intake Outcomes: Food and Nutrient Intake  Physical Signs/Symptoms Outcomes: Biochemical Data, Meal Time Behavior, Weight    Discharge Planning:     Too soon to determine     Alex Ann, [address]  Contact: [phone]

## 2023-03-22 NOTE — PROGRESS NOTES
conducted an initial consultation and Spiritual Assessment for Susy Gould, who is a 80 y. o.,male. Patient's Primary Language is: [place]. According to the patient's EMR Orthodoxy Affiliation is: [place]. The reason the Patient came to the hospital is:   Patient Active Problem List    Diagnosis Date Noted    Kidney stone 03/21/2023    H/O ureteroscopy 03/21/2023    Hypertension 10/16/2012    Neck pain 10/16/2012    Chronic prostatitis 10/16/2012    Impotence of organic origin 10/16/2012    Long term (current) use of anticoagulants 10/16/2012    Elevated PSA 10/16/2012    Hypercholesteremia 10/16/2012    Hematuria 10/16/2012        The  provided the following Interventions:  Initiated a relationship of care and support. Explored issues of tony, belief, spirituality and Mosque/ritual needs while hospitalized. Listened empathically. Provided information about Spiritual Care Services. Offered prayer and assurance of continued prayers on patient's behalf. Chart reviewed. The following outcomes where achieved:  Patient not interested in completing an Advance Medical Directive at this time. Patient shared limited information about both their medical narrative and spiritual journey/beliefs.  confirmed Patient's Orthodoxy Affiliation. Patient expressed gratitude for 's visit. Assessment:  Patient does not have any Mosque/cultural needs that will affect patient's preferences in health care. There are no spiritual or Mosque issues which require intervention at this time. Plan:  Chaplains will continue to follow and will provide pastoral care on an as needed/requested basis.  recommends bedside caregivers page  on duty if patient shows signs of acute spiritual or emotional distress.     [address]   [phone]

## 2023-03-22 NOTE — CARE COORDINATION
03/22/23 1519   IMM Letter   IMM Letter given to Patient/Family/Significant other/Guardian/POA/by: CM delivered IMM Letter to pt at bedside; Pt appealed d/c with CN; [id number]   IMM Letter date given: 03/22/23   IMM Letter time given: 80     Important Message from [facility]" reviewed and explained with the patient ,José Miguel Lerma jr. at bedside and signature was obtained. A signed copy provided to patient/representative. Original signed document placed in patient's chart.   Pt appealed DC with [facility]; [id number]        Siddhartha Reyes MSW  Care Manager

## 2023-03-22 NOTE — PERIOP NOTE
Dr. John Hughes called as patient b/p returning to elevated readings after IV medications and pain medicatons. and patient does not want to go home.  Patient will be admitted to the hospital.

## 2023-03-22 NOTE — PLAN OF CARE
Problem: Chronic Conditions and Co-morbidities  Goal: Patient's chronic conditions and co-morbidity symptoms are monitored and maintained or improved  Outcome: Progressing     Problem: Safety - Adult  Goal: Free from fall injury  Outcome: Progressing     Problem: Pain  Goal: Verbalizes/displays adequate comfort level or baseline comfort level  Outcome: Progressing     Problem: ABCDS Injury Assessment  Goal: Absence of physical injury  Outcome: Progressing

## 2023-03-22 NOTE — DISCHARGE SUMMARY
Where to Get Your Medications        These medications were sent to [address] [place] [address] Morrsi Kirkpatrick [phone]  [address] [place] [address], [address] [place] [address] [facility] [address]      Phone: [phone]   HYDROcodone-acetaminophen 5-325 MG per tablet  levoFLOXacin 500 MG tablet  phenazopyridine 200 MG tablet       Information about where to get these medications is not yet available    Ask your nurse or doctor about these medications  apixaban 5 MG Tabs tablet         * Follow-up Care/Patient Instructions: Activity: activity as tolerated  Diet: regular diet      PCP:  Lilly Martin DO  Referring Provider: Junior Hayden MD    Follow up with:Amor Anne DO in  7 days.   Follow up with:Dr. Sebastian Hercules as directed     Signed:  Mic Dykes PA-C  [phone]    March 22, 2023 10:42 AM

## 2023-03-22 NOTE — PROGRESS NOTES
Bedside shift change report given to [facility] (oncoming nurse) by Gia Sandhu (offgoing nurse). Report included the following information Nurse Handoff Report.

## 2023-03-22 NOTE — PROGRESS NOTES
Bedside shift change report given to Karlie KERR (oncoming nurse) by Namita Baugh (offgoing nurse). Report included the following information Nurse Handoff Report.

## 2023-03-22 NOTE — PERIOP NOTE
TRANSFER - OUT REPORT:    Telephone  report given to [place] on [facility].  being transferred to [address] [place] [address] for routine post-op       Report consisted of patient's Situation, Background, Assessment and   Recommendations(SBAR). Information from the following report(s) Nurse Handoff Report, Surgery Report, and MAR was reviewed with the receiving nurse. [place] Assessment: No data recorded  Lines:   Peripheral IV 03/21/23 Left;Posterior Hand (Active)   Site Assessment Clean, dry & intact 03/21/23 1818   Line Status Infusing 03/21/23 [facility] Connections checked and tightened 03/21/23 1818   Phlebitis Assessment No symptoms 03/21/23 1818   Infiltration Assessment 0 03/21/23 1818   Dressing Status Clean, dry & intact 03/21/23 1818   Dressing Type Transparent 03/21/23 1818        Opportunity for questions and clarification was provided.       Patient transported with:  Sc[employer]

## 2023-03-23 VITALS
WEIGHT: 205.6 LBS | SYSTOLIC BLOOD PRESSURE: 96 MMHG | DIASTOLIC BLOOD PRESSURE: 58 MMHG | HEIGHT: 74 IN | RESPIRATION RATE: 18 BRPM | OXYGEN SATURATION: 92 % | TEMPERATURE: 97.9 F | HEART RATE: 76 BPM | BODY MASS INDEX: 26.39 KG/M2

## 2023-03-23 PROCEDURE — 6360000002 HC RX W HCPCS: Performed by: ANESTHESIOLOGY

## 2023-03-23 PROCEDURE — 2580000003 HC RX 258: Performed by: PHYSICIAN ASSISTANT

## 2023-03-23 PROCEDURE — 6370000000 HC RX 637 (ALT 250 FOR IP): Performed by: UROLOGY

## 2023-03-23 PROCEDURE — 6370000000 HC RX 637 (ALT 250 FOR IP): Performed by: PHYSICIAN ASSISTANT

## 2023-03-23 PROCEDURE — 6360000002 HC RX W HCPCS: Performed by: PHYSICIAN ASSISTANT

## 2023-03-23 RX ADMIN — HYDROMORPHONE HYDROCHLORIDE 0.5 MG: 1 INJECTION, SOLUTION INTRAMUSCULAR; INTRAVENOUS; SUBCUTANEOUS at 05:47

## 2023-03-23 RX ADMIN — SODIUM CHLORIDE, PRESERVATIVE FREE 10 ML: 5 INJECTION INTRAVENOUS at 08:46

## 2023-03-23 RX ADMIN — LISINOPRIL 10 MG: 5 TABLET ORAL at 08:42

## 2023-03-23 RX ADMIN — TAMSULOSIN HYDROCHLORIDE 0.4 MG: 0.4 CAPSULE ORAL at 08:41

## 2023-03-23 RX ADMIN — CARVEDILOL 25 MG: 25 TABLET, FILM COATED ORAL at 08:41

## 2023-03-23 RX ADMIN — ENOXAPARIN SODIUM 30 MG: 100 INJECTION SUBCUTANEOUS at 08:43

## 2023-03-23 RX ADMIN — PHENAZOPYRIDINE 200 MG: 200 TABLET ORAL at 08:42

## 2023-03-23 RX ADMIN — TROSPIUM CHLORIDE 20 MG: 20 TABLET, FILM COATED ORAL at 08:41

## 2023-03-23 ASSESSMENT — PAIN SCALES - GENERAL
PAINLEVEL_OUTOF10: 2
PAINLEVEL_OUTOF10: 5
PAINLEVEL_OUTOF10: 2

## 2023-03-23 ASSESSMENT — PAIN DESCRIPTION - ORIENTATION: ORIENTATION: RIGHT

## 2023-03-23 NOTE — PLAN OF CARE
Problem: Chronic Conditions and Co-morbidities  Goal: Patient's chronic conditions and co-morbidity symptoms are monitored and maintained or improved  Outcome: Adequate for Discharge     Problem: Safety - Adult  Goal: Free from fall injury  Outcome: Adequate for Discharge     Problem: Pain  Goal: Verbalizes/displays adequate comfort level or baseline comfort level  Outcome: Adequate for Discharge     Problem: ABCDS Injury Assessment  Goal: Absence of physical injury  Outcome: Adequate for Discharge

## 2023-03-23 NOTE — PROGRESS NOTES
[facility] Important Medicare Letter, Detailed Notice of Discharge Letter was scanned into Epic. Faxed the listed information to [facility] at [phone]. Spoke with Edmundo in [facility], they received the faxed information and [facility]

## 2023-03-23 NOTE — PROGRESS NOTES
Discharge teaching completed at bedside with patient. Opportunity provided for clarifying questions All answered to patient satisfaction IV removed ID removed and shredded. Patient discharged via wheelchair.

## 2023-03-24 NOTE — PROGRESS NOTES
Physician Progress Note      PATIENT:               Vicki Dunn  CSN #:                  [id number]  :                       1940  ADMIT DATE:       3/21/2023 1:07 PM  [address] [place] DATE:        3/23/2023 2:11 PM  RESPONDING  PROVIDER #:        Cony Meredith MD          QUERY TEXT:    Patient admitted with kidney stone, noted to have atrial fibrillation and is   maintained on Eliquis. PMH HTN, CAD, and thromboembolus. If possible, please   document in progress notes and discharge summary if you are evaluating and/or   treating any of the following: The medical record reflects the following:  Risk Factors: 80 y.o, HTN, CAD, hx of thromboembolus  Clinical Indicators: Per  H&P - AFIB/Hx DVT, on Eliquis 5 mg   Anesthesia: D/c Eliquis 2 days prior per CARDS  Treatment: Eliquis held for procedure and restarted 3 days after DC    Thank You,  Carisa Rodrigues  Options provided:  -- Secondary hypercoagulable state in a patient with atrial fibrillation  -- Other - I will add my own diagnosis  -- Disagree - Not applicable / Not valid  -- Disagree - Clinically unable to determine / Unknown  -- Refer to Clinical Documentation Reviewer    PROVIDER RESPONSE TEXT:    Provider disagreed with this query. I was not treating any of these conditions. just the stone.     Query created by: Darin Frederick on 3/23/2023 9:19 AM      Electronically signed by:  Cony Meredith MD 3/24/2023 8:55 AM

## 2024-06-19 ENCOUNTER — OFFICE VISIT (OUTPATIENT)
Age: 84
End: 2024-06-19

## 2024-06-19 VITALS — WEIGHT: 250 LBS | BODY MASS INDEX: 33.13 KG/M2 | HEIGHT: 73 IN

## 2024-06-19 DIAGNOSIS — S63.631S: ICD-10-CM

## 2024-06-19 DIAGNOSIS — M15.2 DEGENERATIVE ARTHRITIS OF PROXIMAL INTERPHALANGEAL JOINT OF MIDDLE FINGER OF LEFT HAND: ICD-10-CM

## 2024-06-19 DIAGNOSIS — S63.633S: ICD-10-CM

## 2024-06-19 DIAGNOSIS — M15.2 DEGENERATIVE ARTHRITIS OF PROXIMAL INTERPHALANGEAL JOINT OF INDEX FINGER OF LEFT HAND: Primary | ICD-10-CM

## 2024-06-19 RX ORDER — LIDOCAINE HYDROCHLORIDE 10 MG/ML
0.5 INJECTION, SOLUTION INFILTRATION; PERINEURAL ONCE
Status: COMPLETED | OUTPATIENT
Start: 2024-06-19 | End: 2024-06-19

## 2024-06-19 RX ADMIN — LIDOCAINE HYDROCHLORIDE 0.5 ML: 10 INJECTION, SOLUTION INFILTRATION; PERINEURAL at 11:16

## 2024-06-19 NOTE — PROGRESS NOTES
Cristo Oneal Jr. is a 83 y.o. male right handed retiree.  Worker's Compensation and legal considerations: none    Chief Complaint   Patient presents with    Hand Pain     Left hand     Pain Score:   0 - No pain    HPI: Patient presents today with a history of left index finger pain.  He localizes it to the PIP joint.  He also reports occasional pain in the same area of the middle finger.  He says this has been like this for a few months after getting his fingers caught in a dog collar.  He says it is much better but still having some persistent pain.    Date of onset: Early 2024  Injury: No  Prior Treatment:  No    ROS: Review of Systems - General ROS: negative except HPI    Past Medical History:   Diagnosis Date    Alcohol dependence, uncomplicated (HCC)     BPH (benign prostatic hypertrophy)     CHF (congestive heart failure) (HCC)     Chronic kidney disease     kidney stones    Chronic prostatitis     Coronary artery disease     Elevated PSA     Hematuria     Hypercholesteremia     Hypertension     Impotence of organic origin     Long term (current) use of anticoagulants     Neck pain     Thromboembolus (HCC) 8/2012    Left Leg    Unspecified asthma(493.90)     pt states does not have asthma       Past Surgical History:   Procedure Laterality Date    APPENDECTOMY      BACK SURGERY      CARDIAC CATHETERIZATION  08/05/2020    CORONARY ARTERY BYPASS GRAFT  08/10/2020    HERNIA REPAIR      TONSILLECTOMY      URETER SURGERY Right 3/21/2023    CYSTOSCOPY, RIGHT URETEROSCOPY, LASER LITHOTRIPSY, DOUBLE J STENT; C-ARM; HOLMIUM performed by Brandt Molina MD at [facility] MAIN OR    UROLOGICAL SURGERY Left 09/30/2020    CYSTOSCOPY, WITH RETROGRADE PYELOGRAM AND URETERAL STENT INSERTION (Left), Fluoroscopy with Interpretation;  Dr. Lunsford, [facility],         Current Outpatient Medications   Medication Sig Dispense Refill    diclofenac sodium (VOLTAREN) 1 % GEL Apply 2 g topically 4 times daily 240 g 0    furosemide (LASIX)

## (undated) DEVICE — BAG DRAINAGE CUST DISP

## (undated) DEVICE — STER SINGLE BASIN SET W/BOWLS: Brand: CARDINAL HEALTH

## (undated) DEVICE — MEDI-VAC NON-CONDUCTIVE SUCTION TUBING: Brand: CARDINAL HEALTH

## (undated) DEVICE — SOLUTION IRRIG 3000ML 0.9% SOD CHL FLX CONT 0797208] ICU MEDICAL INC]

## (undated) DEVICE — JELLY,LUBE,STERILE,FLIP TOP,TUBE,4-OZ: Brand: MEDLINE

## (undated) DEVICE — GUIDEWIRE ENDOSCP L150CM DIA0.035IN TIP 3CM PTFE NIT

## (undated) DEVICE — CHECK-FLO ADAPTER: Brand: CHECK-FLO

## (undated) DEVICE — SOLUTION IV 1000ML 0.9% SOD CHL

## (undated) DEVICE — Device

## (undated) DEVICE — DRAPE TWL SURG 16X26IN BLU ORB04] ALLCARE INC]

## (undated) DEVICE — GOWN,PREVENTION PLUS,XLN/XL,ST,24/CS: Brand: MEDLINE

## (undated) DEVICE — FLEXOR, URETERAL ACCESS SHEATH WITH AQ, HYDROPHILIC COATING: Brand: FLEXOR

## (undated) DEVICE — SPECIMEN COLLECTION 4-PORT MANIFOLD: Brand: NEPTUNE 2

## (undated) DEVICE — GAUZE,SPONGE,4"X4",16PLY,STRL,LF,10/TRAY: Brand: MEDLINE

## (undated) DEVICE — TRAY PREP DRY W/ PREM GLV 2 APPL 6 SPNG 2 UNDPD 1 OVERWRAP

## (undated) DEVICE — SINGLE-USE DIGITAL FLEXIBLE URETEROSCOPE: Brand: LITHOVUE

## (undated) DEVICE — SYRINGE MED 10ML LUERLOCK TIP W/O SFTY DISP

## (undated) DEVICE — UROLOGY SET

## (undated) DEVICE — NITINOL STONE RETRIEVAL BASKET: Brand: ZERO TIP

## (undated) DEVICE — PACK,CYSTOSCOPY,PK I,AURORA: Brand: MEDLINE

## (undated) DEVICE — DUAL LUMEN URETERAL CATHETER

## (undated) DEVICE — KIT CLN UP BON SECOURS MARYV